# Patient Record
Sex: MALE | Race: WHITE | Employment: OTHER | URBAN - METROPOLITAN AREA
[De-identification: names, ages, dates, MRNs, and addresses within clinical notes are randomized per-mention and may not be internally consistent; named-entity substitution may affect disease eponyms.]

---

## 2017-05-18 ENCOUNTER — THERAPY VISIT (OUTPATIENT)
Dept: PHYSICAL THERAPY | Facility: CLINIC | Age: 72
End: 2017-05-18
Payer: COMMERCIAL

## 2017-05-18 DIAGNOSIS — M62.81 GENERALIZED MUSCLE WEAKNESS: Primary | ICD-10-CM

## 2017-05-18 DIAGNOSIS — R26.9 GAIT DIFFICULTY: ICD-10-CM

## 2017-05-18 PROCEDURE — 97110 THERAPEUTIC EXERCISES: CPT | Mod: GP | Performed by: PHYSICAL THERAPIST

## 2017-05-18 PROCEDURE — 97112 NEUROMUSCULAR REEDUCATION: CPT | Mod: GP | Performed by: PHYSICAL THERAPIST

## 2017-05-18 PROCEDURE — 97161 PT EVAL LOW COMPLEX 20 MIN: CPT | Mod: GP | Performed by: PHYSICAL THERAPIST

## 2017-05-18 NOTE — LETTER
[facility]  [address]  [facility] [facility] [address]  [phone]    May 19, 2017    Re: Wero Wadsworth   :   1945  MRN:  [id number]   REFERRING PHYSICIAN:   Molina Carrasco    [facility]    Date of Initial Evaluation:  2017  Visits:  Rxs Used: 1  Reason for Referral:     Generalized muscle weakness  Gait difficulty    EVALUATION SUMMARY    Subjective:    Patient is a 72 year old male presenting with rehab general hpi.   Wero Wadsworth is a 72 year old male with deconditioning and weakness condition which occurred with previous surgery.      This is a new condition  Patient presents to PT today with c/o leg weakness and difficulty standing or walking due to recent surgery.  DOS: 17: Quadruple Heart Surgery followed by GI bleed complication 1 week later.  Patient referred to PT on 17..    Site of Pain: only area of pain is along the incision for his bypass surgery.  Pain is described as aching     and is constant and reported as 3/10. Associated symptoms:  Buckling/giving out, loss of strength and loss of balance. Pain is the same all the time.  Symptoms are exacerbated by activity, standing, walking, ascending stairs, descending stairs and transfers and relieved by rest and activity/movement. Since onset symptoms are gradually improving.     Previous treatment includes physical therapy (At [facility] There was moderate improvement following previous treatment.    General health as reported by patient is good. Pertinent medical history includes:  Diabetes, overweight and heart problems.Medical allergies: no.Other surgeries include:  No.Current medications:  Cardiac medication, muscle relaxants, high blood pressure medication and other (Insulin, Janvmet, Vit C, Fish Oil, Atoruistatin, Asprin).  Current occupation is Retired; Lives with his wife  Barriers include:   Stairs.  Red flags: none.                 Objective:  Standing Alignment:    Cervical/Thoracic:  Forward head  Shoulder/UE:  Rounded shoulders  Lumbar:  Lordosis decr    Gait:    Gait Type:  Antalgic   Assistive Devices:  Walker  Deviations:  Lumbar:  Trunk flexionGeneral Deviations:  Base of support incr and stance time decr       Hip Evaluation  Hip Strength:    Flexion:   Left: 4/5   Pain:  Right: 4/5   Pain:  Abduction:  Left: 4/5     Pain:Right: 4/5    Pain:  Knee Evaluation:  ROM:    Strength:   Extension:  Left: 4+/5   Pain:      Right: 4+/5   Pain:  Flexion:  Left: 5/5   Pain:      Right: 5/5   Pain:    General Evaluation:  AROM:  normal    Assessment/Plan:    Patient is a 72 year old male with General weakness and gait difficulty complaints.    Patient has the following significant findings with corresponding treatment plan.                Diagnosis 1:  General weakness and gait difficulty due to recent Bypass surgery  Pain -  hot/cold therapy  Decreased strength - therapeutic exercise and therapeutic activities  Impaired gait - gait training  Impaired muscle performance - neuro re-education  Decreased function - therapeutic activities  Therapy Evaluation Codes:   1) History comprised of:   Personal factors that impact the plan of care:      Age.    Comorbidity factors that impact the plan of care are:      Chest pain, Diabetes, Heart problems and Overweight.     Medications impacting care: Cardiac, High blood pressure and Muscle relaxant.  2) Examination of Body Systems comprised of:   Body structures and functions that impact the plan of care:      Lower extermities.   Activity limitations that impact the plan of care are:      Bending, Driving, Lifting, Squatting/kneeling, Stairs, Standing and Walking.  3) Clinical presentation characteristics are:   Evolving/Changing.  4) Decision-Making    Low complexity using standardized patient assessment instrument and/or measureable assessment of functional  outcome.  Cumulative Therapy Evaluation is: Low complexity.    Previous and current functional limitations:  (See Goal Flow Sheet for this information)    Short term and Long term goals: (See Goal Flow Sheet for this information)         Communication ability:  Patient appears to be able to clearly communicate and understand verbal and written communication and follow directions correctly.  Treatment Explanation - The following has been discussed with the patient:   RX ordered/plan of care  Anticipated outcomes  Possible risks and side effects  This patient would benefit from PT intervention to resume normal activities.   Rehab potential is good.    Frequency:  2 X week, once daily  Duration:  for 4 weeks  Discharge Plan:  Achieve all LTG.  Independent in home treatment program.  Reach maximal therapeutic benefit.              Thank you for your referral.    INQUIRIES  Therapist: Tess Jimenez, [facility]  [facility] - [facility] PHYSICAL THERAPY  [address]  [place] [address]  Phone: [phone]  Fax: [phone]

## 2017-05-18 NOTE — MR AVS SNAPSHOT
After Visit Summary   5/18/2017    Wero Wadsworth    MRN: [id number]           Patient Information     Date Of Birth          1945        Visit Information        Provider Department      5/18/2017 2:00 PM Tess Jimenez, PT [facility]        Today's Diagnoses     Generalized muscle weakness    -  1    Gait difficulty           Follow-ups after your visit        Your next 10 appointments already scheduled     May 22, 2017 10:00 AM CDT   SAURABH Extremity with Dalton Hensley, [facility] ([facility]  )    [address]  [place] [address]   [phone]            May 25, 2017  3:20 PM CDT   SAURABH Extremity with Tess Jimenez, PT   [facility] ([facility]  )    [address]  [place] [facility] [facility] [address]   [phone]            May 30, 2017  2:30 PM CDT   SAURABH Extremity with Tess Jimenez, PT   [facility] ([facility]  )    [address]  [place] [facility] [facility] [address]   [phone]            Jun 01, 2017 10:10 AM CDT   SAURABH Extremity with Tess Jimenez, PT   [facility] Physical Therapy ([facility]  )    [address]  [place] [facility] [facility] [address]   [phone]              Who to contact     If you have questions or need follow up information about today's clinic visit or your schedule please contact [facility] PHYSICAL THERAPY directly at [phone].  Normal or non-critical lab and imaging results will be communicated to you by MyChart, letter or phone within 4 business days after the clinic has received the results. If you do not hear from us within 7 days, please contact the clinic through MyChart or phone. If you  "have a critical or abnormal lab result, we will notify you by phone as soon as possible.  Submit refill requests through [employer] or call your pharmacy and they will forward the refill request to us. Please allow 3 business days for your refill to be completed.          Additional Information About Your Visit        Veric[employer] Information     [employer] lets you send messages to your doctor, view your test results, renew your prescriptions, schedule appointments and more. To sign up, go to [email or web address] . Click on \"Log in\" on the left side of the screen, which will take you to the Welcome page. Then click on \"Sign up Now\" on the right side of the page.     You will be asked to enter the access code listed below, as well as some personal information. Please follow the directions to create your username and password.     Your access code is: [email or web address]  Expires: 2017  7:38 PM     Your access code will  in 90 days. If you need help or a new code, please call your [place] clinic or [phone].        Care EveryWhere ID     This is your Care EveryWhere ID. This could be used by other organizations to access your [place] medical records  [id number]         Blood Pressure from Last 3 Encounters:   No data found for BP    Weight from Last 3 Encounters:   No data found for Wt              We Performed the Following     HC PT EVAL, LOW COMPLEXITY     SAURABH INITIAL EVAL REPORT     NEUROMUSCULAR RE-EDUCATION     THERAPEUTIC EXERCISES        Primary Care Provider Office Phone # Fax #    Fernandez Odell Saldana [phone]       [facility] [address]  [facility] [address]        Thank you!     Thank you for choosing [facility] PHYSICAL THERAPY  for your care. Our goal is always to provide you with excellent care. Hearing back from our patients is one way we can continue to improve our services. Please take a few minutes to complete the written " survey that you may receive in the mail after your visit with us. Thank you!             Your Updated Medication List - Protect others around you: Learn how to safely use, store and throw away your medicines at www.disposemymeds.org.      Notice  As of 5/18/2017  7:38 PM    You have not been prescribed any medications.

## 2017-05-19 NOTE — PROGRESS NOTES
Subjective:    Patient is a 72 year old male presenting with rehab general hpi.   Wero Wadsworth is a 72 year old male with deconditioning and weakness condition which occurred with previous surgery.      This is a new condition  Patient presents to PT today with c/o leg weakness and difficulty standing or walking due to recent surgery.  DOS: 4/14/17: Quadruple Heart Surgery followed by GI bleed complication 1 week later.  Patient referred to PT on 5/18/17..    Site of Pain: only area of pain is along the incision for his bypass surgery.  Pain is described as aching     and is constant and reported as 3/10. Associated symptoms:  Buckling/giving out, loss of strength and loss of balance. Pain is the same all the time.  Symptoms are exacerbated by activity, standing, walking, ascending stairs, descending stairs and transfers and relieved by rest and activity/movement. Since onset symptoms are gradually improving.     Previous treatment includes physical therapy (At [facility] ([facility] There was moderate improvement following previous treatment.    General health as reported by patient is good. Pertinent medical history includes:  Diabetes, overweight and heart problems.Medical allergies: no.Other surgeries include:  No.Current medications:  Cardiac medication, muscle relaxants, high blood pressure medication and other (Insulin, Janvmet, Vit C, Fish Oil, Atoruistatin, Asprin).  Current occupation is Retired; Lives with his wife  .          Barriers include:  Stairs.        Red flags: none.                      Objective:    Standing Alignment:    Cervical/Thoracic:  Forward head  Shoulder/UE:  Rounded shoulders  Lumbar:  Lordosis decr            Gait:    Gait Type:  Antalgic   Assistive Devices:  Walker  Deviations:  Lumbar:  Trunk flexionGeneral Deviations:  Base of support incr and stance time decr                                                 Hip Evaluation    Hip Strength:    Flexion:   Left: 4/5   Pain:   Right: 4/5   Pain:                      Abduction:  Left: 4/5     Pain:Right: 4/5    Pain:                           Knee Evaluation:  ROM:            Strength:     Extension:  Left: 4+/5   Pain:      Right: 4+/5   Pain:  Flexion:  Left: 5/5   Pain:      Right: 5/5   Pain:                            General Evaluation:  AROM:  normal                                                                           ROS    Assessment/Plan:      Patient is a 72 year old male with General weakness and gait difficulty complaints.    Patient has the following significant findings with corresponding treatment plan.                Diagnosis 1:  General weakness and gait difficulty due to recent Bypass surgery  Pain -  hot/cold therapy  Decreased strength - therapeutic exercise and therapeutic activities  Impaired gait - gait training  Impaired muscle performance - neuro re-education  Decreased function - therapeutic activities    Therapy Evaluation Codes:   1) History comprised of:   Personal factors that impact the plan of care:      Age.    Comorbidity factors that impact the plan of care are:      Chest pain, Diabetes, Heart problems and Overweight.     Medications impacting care: Cardiac, High blood pressure and Muscle relaxant.  2) Examination of Body Systems comprised of:   Body structures and functions that impact the plan of care:      Lower extermities.   Activity limitations that impact the plan of care are:      Bending, Driving, Lifting, Squatting/kneeling, Stairs, Standing and Walking.  3) Clinical presentation characteristics are:   Evolving/Changing.  4) Decision-Making    Low complexity using standardized patient assessment instrument and/or measureable assessment of functional outcome.  Cumulative Therapy Evaluation is: Low complexity.    Previous and current functional limitations:  (See Goal Flow Sheet for this information)    Short term and Long term goals: (See Goal Flow Sheet for this information)      Communication ability:  Patient appears to be able to clearly communicate and understand verbal and written communication and follow directions correctly.  Treatment Explanation - The following has been discussed with the patient:   RX ordered/plan of care  Anticipated outcomes  Possible risks and side effects  This patient would benefit from PT intervention to resume normal activities.   Rehab potential is good.    Frequency:  2 X week, once daily  Duration:  for 4 weeks  Discharge Plan:  Achieve all LTG.  Independent in home treatment program.  Reach maximal therapeutic benefit.    Please refer to the daily flowsheet for treatment today, total treatment time and time spent performing 1:1 timed codes.

## 2017-05-22 ENCOUNTER — THERAPY VISIT (OUTPATIENT)
Dept: PHYSICAL THERAPY | Facility: CLINIC | Age: 72
End: 2017-05-22
Payer: COMMERCIAL

## 2017-05-22 DIAGNOSIS — M62.81 GENERALIZED MUSCLE WEAKNESS: ICD-10-CM

## 2017-05-22 DIAGNOSIS — R26.9 GAIT DIFFICULTY: ICD-10-CM

## 2017-05-22 PROCEDURE — 97110 THERAPEUTIC EXERCISES: CPT | Mod: GP | Performed by: SPECIALIST/TECHNOLOGIST

## 2017-05-22 PROCEDURE — 97112 NEUROMUSCULAR REEDUCATION: CPT | Mod: GP | Performed by: SPECIALIST/TECHNOLOGIST

## 2017-05-25 ENCOUNTER — THERAPY VISIT (OUTPATIENT)
Dept: PHYSICAL THERAPY | Facility: CLINIC | Age: 72
End: 2017-05-25
Payer: COMMERCIAL

## 2017-05-25 DIAGNOSIS — R26.9 GAIT DIFFICULTY: ICD-10-CM

## 2017-05-25 DIAGNOSIS — M62.81 GENERALIZED MUSCLE WEAKNESS: ICD-10-CM

## 2017-05-25 PROCEDURE — 97112 NEUROMUSCULAR REEDUCATION: CPT | Mod: GP | Performed by: PHYSICAL THERAPIST

## 2017-05-25 PROCEDURE — 97110 THERAPEUTIC EXERCISES: CPT | Mod: GP | Performed by: PHYSICAL THERAPIST

## 2017-05-30 ENCOUNTER — THERAPY VISIT (OUTPATIENT)
Dept: PHYSICAL THERAPY | Facility: CLINIC | Age: 72
End: 2017-05-30
Payer: COMMERCIAL

## 2017-05-30 DIAGNOSIS — M62.81 GENERALIZED MUSCLE WEAKNESS: ICD-10-CM

## 2017-05-30 DIAGNOSIS — R26.9 GAIT DIFFICULTY: ICD-10-CM

## 2017-05-30 PROCEDURE — 97112 NEUROMUSCULAR REEDUCATION: CPT | Mod: GP | Performed by: PHYSICAL THERAPIST

## 2017-05-30 PROCEDURE — 97110 THERAPEUTIC EXERCISES: CPT | Mod: GP | Performed by: PHYSICAL THERAPIST

## 2017-06-01 ENCOUNTER — THERAPY VISIT (OUTPATIENT)
Dept: PHYSICAL THERAPY | Facility: CLINIC | Age: 72
End: 2017-06-01
Payer: COMMERCIAL

## 2017-06-01 DIAGNOSIS — M62.81 GENERALIZED MUSCLE WEAKNESS: ICD-10-CM

## 2017-06-01 DIAGNOSIS — R26.9 GAIT DIFFICULTY: ICD-10-CM

## 2017-06-01 PROCEDURE — 97112 NEUROMUSCULAR REEDUCATION: CPT | Mod: GP | Performed by: PHYSICAL THERAPIST

## 2017-06-01 PROCEDURE — 97110 THERAPEUTIC EXERCISES: CPT | Mod: GP | Performed by: PHYSICAL THERAPIST

## 2017-06-01 NOTE — MR AVS SNAPSHOT
"              After Visit Summary   2017    Wero Wadsworth    MRN: [id number]           Patient Information     Date Of Birth          1945        Visit Information        Provider Department      2017 10:10 AM [facility]        Today's Diagnoses     Generalized muscle weakness        Gait difficulty           Follow-ups after your visit        Who to contact     If you have questions or need follow up information about today's clinic visit or your schedule please contact [facility] directly at [phone].  Normal or non-critical lab and imaging results will be communicated to you by [employer], letter or phone within 4 business days after the clinic has received the results. If you do not hear from us within 7 days, please contact the clinic through [employer] or phone. If you have a critical or abnormal lab result, we will notify you by phone as soon as possible.  Submit refill requests through [employer] or call your pharmacy and they will forward the refill request to us. Please allow 3 business days for your refill to be completed.          Additional Information About Your Visit        MyChart Information     [employer] lets you send messages to your doctor, view your test results, renew your prescriptions, schedule appointments and more. To sign up, go to [email or web address] . Click on \"Log in\" on the left side of the screen, which will take you to the Welcome page. Then click on \"Sign up Now\" on the right side of the page.     You will be asked to enter the access code listed below, as well as some personal information. Please follow the directions to create your username and password.     Your access code is: [email or web address]  Expires: 2017  7:38 PM     Your access code will  in 90 days. If you need help or a new code, please call your [place] clinic or [phone].   "      Care EveryWhere ID     This is your Care EveryWhere ID. This could be used by other organizations to access your [place] medical records  [id number]         Blood Pressure from Last 3 Encounters:   No data found for BP    Weight from Last 3 Encounters:   No data found for Wt              Today, you had the following     No orders found for display       Primary Care Provider Office Phone # Fax #    Fernandez Odell Saldana [phone]       [facility] [address]  [place] [place] [facility] [address]        Thank you!     Thank you for [facility] PHYSICAL THERAPY  for your care. Our goal is always to provide you with excellent care. Hearing back from our patients is one way we can continue to improve our services. Please take a few minutes to complete the written survey that you may receive in the mail after your visit with us. Thank you!             Your Updated Medication List - Protect others around you: Learn how to safely use, store and throw away your medicines at www.disposemymeds.org.      Notice  As of 6/1/2017 11:59 PM    You have not been prescribed any medications.

## 2017-06-01 NOTE — LETTER
[facility]  [address]  [place] [address]  [phone]    2017    Re: Wero Wadsworth   :   1945  MRN:  [id number]   REFERRING PHYSICIAN:   Molina Carrasco    [facility]    Date of Initial Evaluation:  2017  Visits:  Rxs Used: 5  Reason for Referral:     Generalized muscle weakness  Gait difficulty    EVALUATION SUMMARY    DISCHARGE REPORT  Progress reporting period is from 17 to 17.       SUBJECTIVE  Subjective changes noted by patient:  Patient seen 5 times for treatment of general weakness.  Patient feels he is stronger; able to walk without walker within the home and uses the walker for balance outside the home. Patient also reported he was assessed for cardiac rehab today and will begin it next week 3x/week.    Current pain level is: 1/10.     Previous pain level was: 3/10 (chest pain).   Changes in function:  Yes (See Goal flowsheet attached for changes in current functional level)  Adverse reaction to treatment or activity: None  OBJECTIVE  Changes noted in objective findings:      Gait:  Gait is slow and shuffling gait at times but able to correct if cued.  Gait Type:  Antalgic   Assistive Devices:  None  Deviations:  Lumbar:  Trunk flexion  Shoulder Evaluation:  ROM:  AROM:  normal  Hip Evaluation  Hip Strength:    Flexion:   Left: 4+/5   Pain:  Right: 4+/5   Pain:  Abduction:  Left: 4+/5     Pain:Right: 4+/5    Pain:        Knee Evaluation:  ROM:    Strength:   Extension:  Left: 5/5   Pain:      Right: 5/5   Pain:  Flexion:  Left: 5/5   Pain:      Right: 5/5   Pain:      ASSESSMENT/PLAN  Updated problem list and treatment plan: Diagnosis 1:  General weakness  Pain -  hot/cold therapy  Impaired muscle performance -home program  Decreased function -home program  STG/LTGs have been met or progress has been made towards goals:  Yes (See Goal flow sheet  completed today.)  Assessment of Progress: The patient's condition is improving.  Patient is meeting short term goals and is progressing towards long term goals.  Self Management Plans:  Patient is independent in a home treatment program.        Recommendations:  This patient is ready to be discharged from therapy and continue their home treatment program.                  Thank you for your referral.    INQUIRIES  Therapist: Tess Jimenez, [facility]  [facility] - [facility] PHYSICAL THERAPY  [address]  [place] [address]  Phone: [phone]  Fax: [phone]

## 2017-06-13 PROBLEM — R26.9 GAIT DIFFICULTY: Status: RESOLVED | Noted: 2017-05-18 | Resolved: 2017-06-13

## 2017-06-13 PROBLEM — M62.81 GENERALIZED MUSCLE WEAKNESS: Status: RESOLVED | Noted: 2017-05-18 | Resolved: 2017-06-13

## 2017-06-13 NOTE — PROGRESS NOTES
Subjective:    HPI                    Objective:      Gait:  Gait is slow and shuffling gait at times but able to correct if cued.  Gait Type:  Antalgic   Assistive Devices:  None  Deviations:  Lumbar:  Trunk flexion                         Shoulder Evaluation:  ROM:  AROM:  normal                                                                  Hip Evaluation    Hip Strength:    Flexion:   Left: 4+/5   Pain:  Right: 4+/5   Pain:                      Abduction:  Left: 4+/5     Pain:Right: 4+/5    Pain:                           Knee Evaluation:  ROM:            Strength:     Extension:  Left: 5/5   Pain:      Right: 5/5   Pain:  Flexion:  Left: 5/5   Pain:      Right: 5/5   Pain:                        General     ROS    Assessment/Plan:      DISCHARGE REPORT    Progress reporting period is from 5/18/17 to 6/1/17.       SUBJECTIVE  Subjective changes noted by patient:  Patient seen 5 times for treatment of general weakness.  Patient feels he is stronger; able to walk without walker within the home and uses the walker for balance outside the home. Patient also reported he was assessed for cardiac rehab today and will begin it next week 3x/week.    Current pain level is: 1/10.     Previous pain level was: 3/10 (chest pain).   Changes in function:  Yes (See Goal flowsheet attached for changes in current functional level)  Adverse reaction to treatment or activity: None    OBJECTIVE  Changes noted in objective findings:          ASSESSMENT/PLAN  Updated problem list and treatment plan: Diagnosis 1:  General weakness  Pain -  hot/cold therapy  Impaired muscle performance -home program  Decreased function -home program  STG/LTGs have been met or progress has been made towards goals:  Yes (See Goal flow sheet completed today.)  Assessment of Progress: The patient's condition is improving.  Patient is meeting short term goals and is progressing towards long term goals.  Self Management Plans:  Patient is independent in a  home treatment program.        Recommendations:  This patient is ready to be discharged from therapy and continue their home treatment program.    Please refer to the daily flowsheet for treatment today, total treatment time and time spent performing 1:1 timed codes.

## 2021-12-01 ENCOUNTER — THERAPY VISIT (OUTPATIENT)
Dept: PHYSICAL THERAPY | Facility: CLINIC | Age: 76
End: 2021-12-01
Payer: COMMERCIAL

## 2021-12-01 DIAGNOSIS — M54.50 RIGHT-SIDED LOW BACK PAIN WITHOUT SCIATICA: Primary | ICD-10-CM

## 2021-12-01 PROCEDURE — 97161 PT EVAL LOW COMPLEX 20 MIN: CPT | Mod: GP | Performed by: PHYSICAL THERAPIST

## 2021-12-01 PROCEDURE — 97110 THERAPEUTIC EXERCISES: CPT | Mod: GP | Performed by: PHYSICAL THERAPIST

## 2021-12-01 NOTE — PROGRESS NOTES
Physical Therapy Initial Evaluation  Subjective:    Therapist Generated HPI Evaluation  Problem details: Patient reports the onset of low back pain in October 2021 and he feels it is a result of limping following his knee replacement surgery in July 2021. He reports the pain has been intermittent and improving over the last month. .         Type of problem:  Lumbar.    This is a new condition.  Condition occurred with:  Insidious onset.  Where condition occurred: for unknown reasons.  Patient reports pain:  Lumbar spine right and lower lumbar spine.  Pain is described as aching and is intermittent.  Pain radiates to:  No radiation. Pain is worse in the A.M..  Since onset symptoms are gradually improving.  Associated symptoms:  Loss of motion/stiffness and loss of strength. Symptoms are exacerbated by standing and twisting  Relieved by: tylenol.  Imaging testing: none.  There was none improvement following previous treatment.  Work activity restrictions: retired.  Barriers include:  None as reported by patient.    Patient Health History  Wero Wadsworth being seen for Lower Back Pain.          Pain is reported as 4/10 on pain scale.  General health as reported by patient is fair.  Pertinent medical history includes: sleep disorder/apnea, numbness/tingling, diabetes, overweight and high blood pressure.     Medical allergies: other. Other medical allergies details: lisinophile.   Surgeries include:  Orthopedic surgery and other. Other surgery history details: prostate, left knee.        Current occupation is retired.   Primary job tasks include:  Computer work, lifting/carrying and prolonged sitting.                                    Objective:    Gait:    Gait Type:  Antalgic     Deviations:  General Deviations:  Stride length decr and stance time decr               Lumbar/SI Evaluation  ROM:    AROM Lumbar:   Flexion:            Finger tips to ankle, no pain  Ext:                    Moderate limitation, painful   Side  Bend:        Left:  Finger tips to mid thigh    Right:  Finger tips to mid thigh  Rotation:           Left:  90 deg    Right:  90 deg  Side Glide:        Left:     Right:           Lumbar Myotomes:  Lumbar myotomes: grossly 4+/5 bilaterally.                  Neural Tension/Mobility:      Left side:SLR or Slump  negative.     Right side:   Slump or SLR  negative.   Lumbar Palpation:      Tenderness present at Right: Erector Spinae (at level of L3/4/5)      Spinal Segmental Conclusions:     Level: Hypo noted at L5, L4, L3 and L2                                                     Laura Lumbar Evaluation        Test Movements:  FIS: During: no effect  After: no effect  Mechanical Response: no effect  Repeat FIS: During: no effect  After: no effect  Mechanical Response: no effect  EIS: During: produces  After: no effect  Mechanical Response: no effect  Repeat EIS: During: decreases and centralizing  After: centralizing and better  Mechanical Response: IncROM            Conclusion: derangement                                         ROS    Assessment/Plan:    Patient is a 76 year old male with lumbar complaints.    Patient has the following significant findings with corresponding treatment plan.                Diagnosis 1:  Right sided low back pain without sciatica  Pain -  hot/cold therapy, self management, education, directional preference exercise and home program  Decreased ROM/flexibility - therapeutic exercise, therapeutic activity and home program  Decreased strength - therapeutic exercise, therapeutic activities and home program  Decreased function - therapeutic activities and home program  Impaired posture - neuro re-education, therapeutic activities and home program    Therapy Evaluation Codes:   1) History comprised of:   Personal factors that impact the plan of care:      None.    Comorbidity factors that impact the plan of care are:      Overweight.     Medications impacting care: None.  2) Examination  of Body Systems comprised of:   Body structures and functions that impact the plan of care:      Lumbar spine.   Activity limitations that impact the plan of care are:      Bathing, Bending, Dressing, Lifting and Standing.  3) Clinical presentation characteristics are:   Stable/Uncomplicated.  4) Decision-Making    Low complexity using standardized patient assessment instrument and/or measureable assessment of functional outcome.  Cumulative Therapy Evaluation is: Low complexity.    Previous and current functional limitations:  (See Goal Flow Sheet for this information)    Short term and Long term goals: (See Goal Flow Sheet for this information)     Communication ability:  Patient appears to be able to clearly communicate and understand verbal and written communication and follow directions correctly.  Treatment Explanation - The following has been discussed with the patient:   RX ordered/plan of care  Anticipated outcomes  Possible risks and side effects  This patient would benefit from PT intervention to resume normal activities.   Rehab potential is good.    Frequency:  1 X week, once daily  Duration:  for 6 weeks  Discharge Plan:  Achieve all LTG.  Independent in home treatment program.  Reach maximal therapeutic benefit.    Please refer to the daily flowsheet for treatment today, total treatment time and time spent performing 1:1 timed codes.

## 2021-12-13 ENCOUNTER — THERAPY VISIT (OUTPATIENT)
Dept: PHYSICAL THERAPY | Facility: CLINIC | Age: 76
End: 2021-12-13
Payer: COMMERCIAL

## 2021-12-13 DIAGNOSIS — M54.50 RIGHT-SIDED LOW BACK PAIN WITHOUT SCIATICA: Primary | ICD-10-CM

## 2021-12-13 PROCEDURE — 97110 THERAPEUTIC EXERCISES: CPT | Mod: GP | Performed by: PHYSICAL THERAPIST

## 2023-03-01 NOTE — PROGRESS NOTES
[facility]    OUTPATIENT Physical Therapy ORTHOPEDIC EVALUATION  PLAN OF TREATMENT FOR OUTPATIENT REHABILITATION  (COMPLETE FOR INITIAL CLAIMS ONLY)  Patient's Last Name, First Name, M.I.  YOB: 1945  Wero Wadsworth    Provider s Name:  [facility]   Medical Record No.  [id number]   Start of Care Date:  12/01/21   Onset Date:   10/01/21   Type:     _X__PT   ___OT Medical Diagnosis:    Encounter Diagnosis   Name Primary?     Right-sided low back pain without sciatica Yes        Treatment Diagnosis:  chronic low back pain        Goals:     12/01/21 0500   Body Part   Goals listed below are for Low back   Goal #1   Goal #1 standing   Previous Functional Level No restrictions   Current Functional Level Minutes patient can stand   Performance level 10 with >6/10 low back pain   STG Target Performance Minutes patient will be able to stand   Performance level >10 with <6/10 low back pain   Rationale for housekeeping tasks such as vacuuming, bed making, mowing, gardening;for personal hygiene;for meal preparation;for safe household ambulation;for safe community ambulation   Due date 12/22/21   LTG Target Performance Minutes patient will be able to stand   Performance Level >30 with 0/10 low back pain   Rationale for housekeeping tasks such as vacuuming, bed making, mowing;for personal hygiene;for meal preparation;for safe household ambulation;for safe community ambulation   Due date 01/05/22       Therapy Frequency:  1 time per week  Predicted Duration of Therapy Intervention:  6 weeks    Kevin Gil, PT                 I CERTIFY THE NEED FOR THESE SERVICES FURNISHED UNDER        THIS PLAN OF TREATMENT AND WHILE UNDER MY CARE .             Physician Signature               Date    X_____________________________________________________                              Certification Date From:  12/01/21   Certification Date To:  03/01/22    Referring Provider:  Niurka Oh*    Initial Assessment        See Epic Evaluation SOC Date: 12/01/21                                                                  Moderate, Acute